# Patient Record
Sex: MALE | URBAN - METROPOLITAN AREA
[De-identification: names, ages, dates, MRNs, and addresses within clinical notes are randomized per-mention and may not be internally consistent; named-entity substitution may affect disease eponyms.]

---

## 2020-04-02 ENCOUNTER — EMERGENCY (EMERGENCY)
Facility: HOSPITAL | Age: 55
LOS: 0 days | Discharge: HOME | End: 2020-04-02
Admitting: EMERGENCY MEDICINE
Payer: COMMERCIAL

## 2020-04-02 VITALS
TEMPERATURE: 101 F | RESPIRATION RATE: 18 BRPM | HEART RATE: 94 BPM | SYSTOLIC BLOOD PRESSURE: 111 MMHG | OXYGEN SATURATION: 96 % | DIASTOLIC BLOOD PRESSURE: 61 MMHG

## 2020-04-02 DIAGNOSIS — R50.9 FEVER, UNSPECIFIED: ICD-10-CM

## 2020-04-02 DIAGNOSIS — B34.9 VIRAL INFECTION, UNSPECIFIED: ICD-10-CM

## 2020-04-02 DIAGNOSIS — J98.8 OTHER SPECIFIED RESPIRATORY DISORDERS: ICD-10-CM

## 2020-04-02 DIAGNOSIS — R05 COUGH: ICD-10-CM

## 2020-04-02 DIAGNOSIS — J34.89 OTHER SPECIFIED DISORDERS OF NOSE AND NASAL SINUSES: ICD-10-CM

## 2020-04-02 DIAGNOSIS — Z20.828 CONTACT WITH AND (SUSPECTED) EXPOSURE TO OTHER VIRAL COMMUNICABLE DISEASES: ICD-10-CM

## 2020-04-02 PROCEDURE — 99283 EMERGENCY DEPT VISIT LOW MDM: CPT

## 2020-04-04 NOTE — ED STATDOCS - OBJECTIVE STATEMENT
Pt presents to ED with fever, cough, runny nose, body aches, sore throat   FOR   few days.   Pt concerned for possible COVID-19.   Pt here for testing.

## 2020-04-04 NOTE — ED STATDOCS - NS ED ROS FT
ROS:   Constitutional- +fever, +chills.    ENT- +rhinorrhea, no sore throat, no congestion.    Cardiac- no chest pain, no palpitations,   Respiratory- +cough, -SOB    Abdomen- No nausea, no vomiting, no diarrhea.    Urinary- no dysuria, no urgency, no frequency.    Skin- No rashes

## 2020-04-04 NOTE — ED STATDOCS - PHYSICAL EXAMINATION
Constitutional: NAD AAOx3. Nontoxic, well appearing. Speaking full sentences  w/o distress  Eyes: EOMI, pupils equal  Head: Normocephalic atraumatic  Mouth: no airway obstruction  Cardiac: [id number]   Resp: Lungs CTAB  GI: Abd s/nt/nd  Neuro: motor and sensory intact

## 2023-09-27 NOTE — ED STATDOCS - NS ED ATTENDING NAME FT
Zaria Ftsg Text: Given the location of the defect, shape of the defect and the proximity to free margins a full thickness skin graft was deemed most appropriate. Using a sterile surgical marker, the primary defect shape was transferred to the donor site. The area thus outlined was incised deep to adipose tissue with a #15 scalpel blade. The harvested graft was then trimmed of adipose tissue until only dermis and epidermis was left. The skin graft was then placed in the primary defect and oriented appropriately. The donor site will heal by secondary intention.